# Patient Record
Sex: MALE | Race: BLACK OR AFRICAN AMERICAN | NOT HISPANIC OR LATINO | Employment: FULL TIME | URBAN - METROPOLITAN AREA
[De-identification: names, ages, dates, MRNs, and addresses within clinical notes are randomized per-mention and may not be internally consistent; named-entity substitution may affect disease eponyms.]

---

## 2020-03-23 ENCOUNTER — HOSPITAL ENCOUNTER (EMERGENCY)
Facility: HOSPITAL | Age: 30
Discharge: HOME | End: 2020-03-23
Attending: EMERGENCY MEDICINE

## 2020-03-23 VITALS
HEART RATE: 93 BPM | OXYGEN SATURATION: 98 % | SYSTOLIC BLOOD PRESSURE: 149 MMHG | WEIGHT: 142.5 LBS | TEMPERATURE: 99 F | HEIGHT: 72 IN | BODY MASS INDEX: 19.3 KG/M2 | RESPIRATION RATE: 20 BRPM | DIASTOLIC BLOOD PRESSURE: 85 MMHG

## 2020-03-23 DIAGNOSIS — R30.0 DYSURIA: Primary | ICD-10-CM

## 2020-03-23 LAB
BILIRUB UR QL STRIP: NEGATIVE
CLARITY UR: CLEAR
COLOR UR: YELLOW
GLUCOSE UR QL STRIP: NEGATIVE
HGB UR QL STRIP: NEGATIVE
KETONES UR QL STRIP: NEGATIVE
LEUKOCYTE ESTERASE UR QL STRIP: NEGATIVE
NITRITE UR QL STRIP: NEGATIVE
PH UR STRIP: 8 [PH] (ref 5–8)
PROT UR QL STRIP: NEGATIVE
SP GR UR STRIP: 1.02 (ref 1–1.03)
URN SPEC COLLECT METH UR: NORMAL
UROBILINOGEN UR STRIP-ACNC: 1 EU/DL

## 2020-03-23 PROCEDURE — 25000003 PHARM REV CODE 250: Performed by: PHYSICIAN ASSISTANT

## 2020-03-23 PROCEDURE — 87491 CHLMYD TRACH DNA AMP PROBE: CPT

## 2020-03-23 PROCEDURE — 96372 THER/PROPH/DIAG INJ SC/IM: CPT

## 2020-03-23 PROCEDURE — 99284 EMERGENCY DEPT VISIT MOD MDM: CPT | Mod: 25

## 2020-03-23 PROCEDURE — 63600175 PHARM REV CODE 636 W HCPCS: Performed by: PHYSICIAN ASSISTANT

## 2020-03-23 PROCEDURE — 81003 URINALYSIS AUTO W/O SCOPE: CPT

## 2020-03-23 RX ORDER — AZITHROMYCIN 250 MG/1
1000 TABLET, FILM COATED ORAL
Status: COMPLETED | OUTPATIENT
Start: 2020-03-23 | End: 2020-03-23

## 2020-03-23 RX ORDER — CEFTRIAXONE 250 MG/1
250 INJECTION, POWDER, FOR SOLUTION INTRAMUSCULAR; INTRAVENOUS
Status: COMPLETED | OUTPATIENT
Start: 2020-03-23 | End: 2020-03-23

## 2020-03-23 RX ADMIN — CEFTRIAXONE SODIUM 250 MG: 250 INJECTION, POWDER, FOR SOLUTION INTRAMUSCULAR; INTRAVENOUS at 04:03

## 2020-03-23 RX ADMIN — AZITHROMYCIN MONOHYDRATE 1000 MG: 250 TABLET ORAL at 04:03

## 2020-03-23 NOTE — ED PROVIDER NOTES
"   History      Chief Complaint   Patient presents with    Dysuria     Pt states, "I was treated for a UTI about 2 weeks ago and it feels like it is back."       Review of patient's allergies indicates:  No Known Allergies     HPI   HPI    3/23/2020, 4:42 PM   History obtained from the patient      History of Present Illness: Sam Jerome Milligan Jr. is a 29 y.o. male patient who presents to the Emergency Department for dysuria for 2 weeks.  Was not able to get abx from pharmacy due to cost. Symptoms are moderate in severity.     No further complaints or concerns at this time.           PCP: Primary Doctor No       Past Medical History:  No past medical history on file.      Past Surgical History:  No past surgical history on file.        Family History:  Family History   Problem Relation Age of Onset    Cataracts Maternal Grandmother            Social History:  Social History     Tobacco Use    Smoking status: Former Smoker   Substance and Sexual Activity    Alcohol use: No    Drug use: No    Sexual activity: Not on file       ROS     Review of Systems   Constitutional: Negative for chills and fever.   HENT: Negative for facial swelling and trouble swallowing.    Eyes: Negative for pain and discharge.   Respiratory: Negative for chest tightness and shortness of breath.    Cardiovascular: Negative for palpitations and leg swelling.   Gastrointestinal: Negative for diarrhea and vomiting.   Endocrine: Negative for polydipsia and polyuria.   Genitourinary: Positive for dysuria. Negative for decreased urine volume and flank pain.   Musculoskeletal: Negative for joint swelling and neck stiffness.   Skin: Negative for rash and wound.   Neurological: Negative for syncope and light-headedness.   All other systems reviewed and are negative.      Physical Exam      Initial Vitals [03/23/20 1632]   BP Pulse Resp Temp SpO2   (!) 149/85 93 20 99.1 °F (37.3 °C) 98 %      MAP       --         Physical Exam  Vital signs and " nursing notes reviewed.  Constitutional: Patient is in NAD. Awake and alert. Well-developed and well-nourished.  Head: Atraumatic. Normocephalic.  Eyes: PERRL. EOM intact. Conjunctivae nl. No scleral icterus.  ENT: Mucous membranes are moist. Oropharynx is clear.  Neck: Supple. No JVD. No lymphadenopathy.  No meningismus  Cardiovascular: Regular rate and rhythm. No murmurs, rubs, or gallops. Distal pulses are 2+ and symmetric.  Pulmonary/Chest: No respiratory distress. Clear to auscultation bilaterally. No wheezing, rales, or rhonchi.  Abdominal: Soft. Non-distended. No TTP. No rebound, guarding, or rigidity. Good bowel sounds.  Genitourinary: No CVA tenderness  Musculoskeletal: Moves all extremities. No edema.   Skin: Warm and dry.  Neurological: Awake and alert. No acute focal neurological deficits are appreciated.  Psychiatric: Normal affect. Good eye contact. Appropriate in content.      ED Course          Procedures  ED Vital Signs:  Vitals:    03/23/20 1632   BP: (!) 149/85   Pulse: 93   Resp: 20   Temp: 99.1 °F (37.3 °C)   TempSrc: Oral   SpO2: 98%   Weight: 64.7 kg (142 lb 8.4 oz)   Height: 6' (1.829 m)                 Imaging Results:  Imaging Results    None            The Emergency Provider reviewed the vital signs and test results, which are outlined above.    ED Discussion             Medication(s) given in the ER:  Medications   cefTRIAXone injection 250 mg (has no administration in time range)   azithromycin tablet 1,000 mg (has no administration in time range)           Follow-up Information     [facility] In 2 days.    Contact information:  [address] [facility] [address] [phone]                          Medication List      ASK your doctor about these medications    moxifloxacin 0.5 % ophthalmic solution  Commonly known as:  VIGAMOX     naproxen 375 MG tablet  Commonly known as:  NAPROSYN  Take 1 tablet (375 mg total) by mouth 2 (two) times daily with meals.      oxyCODONE-acetaminophen  mg per tablet  Commonly known as:  PERCOCET  Take 0.5-1 tablets by mouth every 4 (four) hours as needed for Pain.                Medical Decision Making        All findings were reviewed with the patient/family in detail.   All remaining questions and concerns were addressed at that time.  Patient/family has been counseled regarding the need for follow-up as well as the indication to return to the emergency room should new or worrisome developments occur.        MDM               Clinical Impression:        ICD-10-CM ICD-9-CM   1. Dysuria R30.0 788.1             Umm Ayoub PA-C  03/23/20 1643

## 2020-03-24 ENCOUNTER — TELEPHONE (OUTPATIENT)
Dept: EMERGENCY MEDICINE | Facility: HOSPITAL | Age: 30
End: 2020-03-24

## 2020-03-24 LAB
C TRACH DNA SPEC QL NAA+PROBE: NOT DETECTED
N GONORRHOEA DNA SPEC QL NAA+PROBE: DETECTED

## 2020-03-25 ENCOUNTER — TELEPHONE (OUTPATIENT)
Dept: EMERGENCY MEDICINE | Facility: HOSPITAL | Age: 30
End: 2020-03-25

## 2022-06-03 ENCOUNTER — HOSPITAL ENCOUNTER (EMERGENCY)
Facility: HOSPITAL | Age: 32
Discharge: HOME | End: 2022-06-03
Attending: EMERGENCY MEDICINE
Payer: MEDICAID

## 2022-06-03 VITALS
OXYGEN SATURATION: 99 % | WEIGHT: 150 LBS | DIASTOLIC BLOOD PRESSURE: 87 MMHG | HEIGHT: 72 IN | SYSTOLIC BLOOD PRESSURE: 133 MMHG | RESPIRATION RATE: 17 BRPM | TEMPERATURE: 98 F | BODY MASS INDEX: 20.32 KG/M2 | HEART RATE: 62 BPM

## 2022-06-03 DIAGNOSIS — H18.821 CORNEAL ABRASION OF RIGHT EYE DUE TO CONTACT LENS: Primary | ICD-10-CM

## 2022-06-03 PROCEDURE — 99282 EMERGENCY DEPT VISIT SF MDM: CPT

## 2022-06-03 PROCEDURE — 25000003 PHARM REV CODE 250: Performed by: EMERGENCY MEDICINE

## 2022-06-03 RX ORDER — TETRACAINE HYDROCHLORIDE 5 MG/ML
2 SOLUTION OPHTHALMIC
Status: DISCONTINUED | OUTPATIENT
Start: 2022-06-03 | End: 2022-06-03 | Stop reason: HOSPADM

## 2022-06-03 RX ORDER — HYDROCODONE BITARTRATE AND ACETAMINOPHEN 5; 325 MG/1; MG/1
1 TABLET ORAL EVERY 4 HOURS PRN
Qty: 10 TABLET | Refills: 0 | Status: SHIPPED | OUTPATIENT
Start: 2022-06-03

## 2022-06-03 RX ORDER — CIPROFLOXACIN HYDROCHLORIDE 3 MG/ML
1 SOLUTION/ DROPS OPHTHALMIC EVERY 4 HOURS
Qty: 1 EACH | Refills: 0 | Status: SHIPPED | OUTPATIENT
Start: 2022-06-03

## 2022-06-03 NOTE — Clinical Note
"Jean Paul Ervin" Milligan was seen and treated in our emergency department on 6/3/2022.  He may return to work on 06/05/2022.       If you have any questions or concerns, please don't hesitate to call.      ARETHA Anderson"

## 2022-06-03 NOTE — ED PROVIDER NOTES
Encounter Date: 6/3/2022       History     Chief Complaint   Patient presents with    Eye Pain     Pain and itching to right eye.     31 year old male presents to the ED complaining of right eye pain and itching that began this morning. Patient reports he woke up their morning with his eyelid crusted shut. He wears contacts and slept with them in last night. The patient states he initially thought it was his allergies which usually relieve with eye drops but he had no relief today. 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 He states his right eye vision is blurry. The patient endorses photophobia and is sitting in the exam with with sunglasses on requesting the lights be dimmed. Eye pain is increased with opening his eye. No fevers, chills, cough, sore throat, or ear pain.         Review of patient's allergies indicates:  No Known Allergies  No past medical history on file.  No past surgical history on file.  Family History   Problem Relation Age of Onset    Cataracts Maternal Grandmother      Social History     Tobacco Use    Smoking status: Former Smoker   Substance Use Topics    Alcohol use: No    Drug use: No     Review of Systems   Constitutional: Negative for chills, fever and unexpected weight change.   Eyes: Positive for photophobia, pain (right), discharge, itching and visual disturbance. Negative for redness.   Respiratory: Negative for shortness of breath.    Cardiovascular: Negative for chest pain.   Gastrointestinal: Negative for abdominal pain, nausea and vomiting.   Genitourinary: Negative for dysuria.   Musculoskeletal: Negative for arthralgias.   Skin: Negative for rash.   All other systems reviewed and are negative.      Physical Exam     Initial Vitals [06/03/22 0825]   BP Pulse Resp Temp SpO2   (!) 138/90 66 16 98.4 °F (36.9 °C) 98 %      MAP       --         Physical Exam    Nursing note and vitals reviewed.  Constitutional: Vital  signs are normal. He appears well-developed.   HENT:   Head: Normocephalic and atraumatic.   Right Ear: Tympanic membrane and external ear normal.   Left Ear: Tympanic membrane and external ear normal.   Nose: Nose normal.   Mouth/Throat: Uvula is midline, oropharynx is clear and moist and mucous membranes are normal.   Eyes: Conjunctivae, EOM and lids are normal. Pupils are equal, round, and reactive to light.   Slit lamp exam:       The right eye shows corneal abrasion. The right eye shows no corneal ulcer.   Neck: Trachea normal. Neck supple.   Cardiovascular: Normal rate, regular rhythm, normal heart sounds and normal pulses.   Abdominal: Abdomen is soft. Bowel sounds are normal.   Musculoskeletal:      Cervical back: Neck supple.     Neurological: He is alert. He has normal strength. GCS eye subscore is 4. GCS verbal subscore is 5. GCS motor subscore is 6.   Skin: Skin is warm and dry.   Psychiatric: He has a normal mood and affect. His speech is normal and behavior is normal. Judgment and thought content normal. Cognition and memory are normal.         ED Course   Procedures  Labs Reviewed - No data to display       Imaging Results    None          Medications - No data to display  Medical Decision Making:   Initial Assessment:   31 year old male presents to the ED complaining of right eye pain and itching that began this morning. Patient reports he woke up their morning with his eyelid crusted shut. He wears contacts and slept with them in last night. The patient states he initially thought it was his allergies which usually relieve with eye drops but he had no relief today. 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 He states his right eye vision is blurry. The patient endorses photophobia and is sitting in the exam with with sunglasses on requesting the lights be dimmed. Eye pain is increased with opening his eye. No fevers, chills, cough, sore throat,  or ear pain.   Differential Diagnosis:   Conjunctivitis, blepharitis, corneal abrasion  ED Management:  31 year old male presents to the ED with right eye pain, photophobia, and itching. He wears contacts and works construction. Slit lamp exam performed shows corneal abrasion. No corneal ulcer noted. Tetanus is UTD. Discharge home with antibiotics, pain control, and opthalmology follow-up. Patient instructed not to wear contacts in that eye until opthalmologic evaluation. Return precautions given and patient is agreeable with plan.     Attending Note:    I discussed the patient's care with Advanced Practice Clinician.  I reviewed their note and agree with the history, physical, assessment, diagnosis, treatment, all procedures performed, xray and EKG interpretations and discharge plan provided by the Advanced Practice Clinician. My overall impression is right eye corneal abrasion.  Patient has history of contact lens use.  He will be discharged home with Cipro.  The patient has been instructed to follow up with their physician or the one provided as well as specific return precautions.   Katheryn Oneil M.D. 6/3/2022 7:44 PM                        Clinical Impression:   Final diagnoses:  [H18.821] Corneal abrasion of right eye due to contact lens (Primary)          ED Disposition Condition    Discharge Stable        ED Prescriptions     Medication Sig Dispense Start Date End Date Auth. Provider    ciprofloxacin HCl (CILOXAN) 0.3 % ophthalmic solution Place 1 drop into the right eye every 4 (four) hours. 1 each 6/3/2022  ARETHA Anderson    HYDROcodone-acetaminophen (NORCO) 5-325 mg per tablet Take 1 tablet by mouth every 4 (four) hours as needed. 10 tablet 6/3/2022  ARETHA Anderson        Follow-up Information     Follow up With Specialties Details Why Contact Info    Bari Pimentel MD Ophthalmology Schedule an appointment as soon as possible for a visit in 2 days  [address] [place] [address]  [id number]  [phone]             Katheryn Oneil MD  06/03/22 1945

## 2022-06-03 NOTE — DISCHARGE INSTRUCTIONS
Medications as directed please follow-up with ophthalmologist as directed for further evaluation, definitive care  Return for any concerns  Please do not wear contact lenses until abrasions completely healed and you were released to do so by the ophthalmologist.

## 2022-08-15 ENCOUNTER — HOSPITAL ENCOUNTER (EMERGENCY)
Facility: HOSPITAL | Age: 32
Discharge: HOME | End: 2022-08-15
Attending: EMERGENCY MEDICINE
Payer: MEDICAID

## 2022-08-15 VITALS
BODY MASS INDEX: 18.96 KG/M2 | HEART RATE: 77 BPM | RESPIRATION RATE: 18 BRPM | OXYGEN SATURATION: 98 % | DIASTOLIC BLOOD PRESSURE: 81 MMHG | SYSTOLIC BLOOD PRESSURE: 128 MMHG | HEIGHT: 72 IN | TEMPERATURE: 98 F | WEIGHT: 140 LBS

## 2022-08-15 DIAGNOSIS — K02.9 PAIN DUE TO DENTAL CARIES: Primary | ICD-10-CM

## 2022-08-15 DIAGNOSIS — K08.89 TOOTHACHE: ICD-10-CM

## 2022-08-15 PROCEDURE — 96372 THER/PROPH/DIAG INJ SC/IM: CPT | Performed by: NURSE PRACTITIONER

## 2022-08-15 PROCEDURE — 63600175 PHARM REV CODE 636 W HCPCS: Performed by: NURSE PRACTITIONER

## 2022-08-15 PROCEDURE — 99284 EMERGENCY DEPT VISIT MOD MDM: CPT

## 2022-08-15 RX ORDER — KETOROLAC TROMETHAMINE 30 MG/ML
30 INJECTION, SOLUTION INTRAMUSCULAR; INTRAVENOUS
Status: COMPLETED | OUTPATIENT
Start: 2022-08-15 | End: 2022-08-15

## 2022-08-15 RX ORDER — AMOXICILLIN 875 MG/1
875 TABLET, FILM COATED ORAL 2 TIMES DAILY
Qty: 14 TABLET | Refills: 0 | Status: SHIPPED | OUTPATIENT
Start: 2022-08-15

## 2022-08-15 RX ORDER — IBUPROFEN 600 MG/1
600 TABLET ORAL EVERY 6 HOURS PRN
Qty: 20 TABLET | Refills: 0 | OUTPATIENT
Start: 2022-08-15 | End: 2022-09-12

## 2022-08-15 RX ADMIN — KETOROLAC TROMETHAMINE 30 MG: 30 INJECTION, SOLUTION INTRAMUSCULAR at 01:08

## 2022-08-15 NOTE — Clinical Note
"Jean Paul Ervin" Milligan was seen and treated in our emergency department on 8/15/2022.  He may return to work on 08/17/2022.       If you have any questions or concerns, please don't hesitate to call.      Juliette Locke NP"

## 2022-08-15 NOTE — ED PROVIDER NOTES
Encounter Date: 8/15/2022       History     Chief Complaint   Patient presents with    Dental Pain     C/o right lower dental pain x 2 months. Pt reports his dentist cancelling and unable to get the tooth pulled. Has not followed up. Pain worse.      32-year-old male presents to the ER with pain to his right lower posterior molar tooth for the past 3 days.  He states he has a cavity in this tooth, it has been given an problems for months, he was prescribed amoxicillin antibiotic about a month ago and symptoms resolved, he has been unable to make an appointment with his dentist in the meantime.  He knows that he needs to be pulled.  He reports pain began again 2 days ago.  No drainage or swelling.  No facial swelling.  No fever.  No difficulty eating or drinking or other complaints or concerns.        Review of patient's allergies indicates:  No Known Allergies  No past medical history on file.  No past surgical history on file.  Family History   Problem Relation Age of Onset    Cataracts Maternal Grandmother      Social History     Tobacco Use    Smoking status: Former Smoker   Substance Use Topics    Alcohol use: No    Drug use: No     Review of Systems   Constitutional: Negative for chills, diaphoresis, fatigue and fever.   HENT: Positive for dental problem. Negative for sore throat.    Eyes: Negative for photophobia and visual disturbance.   Respiratory: Negative for shortness of breath.    Cardiovascular: Negative for chest pain.   Gastrointestinal: Negative for nausea.   Endocrine: Negative for polydipsia, polyphagia and polyuria.   Genitourinary: Negative for dysuria.   Musculoskeletal: Negative for back pain.   Skin: Negative for rash.   Allergic/Immunologic: Negative for immunocompromised state.   Neurological: Negative for dizziness, seizures, syncope, speech difficulty, weakness, light-headedness, numbness and headaches.   Hematological: Does not bruise/bleed easily.   All other systems reviewed and  are negative.      Physical Exam     Initial Vitals [08/15/22 1210]   BP Pulse Resp Temp SpO2   121/83 71 17 98.1 °F (36.7 °C) 99 %      MAP       --         Physical Exam    Nursing note and vitals reviewed.  Constitutional: He appears well-developed and well-nourished. He is not diaphoretic. No distress.   HENT:   Head: Normocephalic and atraumatic.   Right Ear: External ear normal.   Left Ear: External ear normal.   Nose: Nose normal.   Mouth/Throat: Oropharynx is clear and moist and mucous membranes are normal. No oral lesions. No trismus in the jaw. Abnormal dentition. Dental caries present. No dental abscesses, uvula swelling or lacerations. No oropharyngeal exudate.           ED Course   Procedures  Labs Reviewed - No data to display       Imaging Results    None          Medications   ketorolac injection 30 mg (30 mg Intramuscular Given 8/15/22 1303)     Medical Decision Making:   ED Management:  Chronic dental caries throughout multiple teeth was notable in his right posterior lower molar tooth that he has pain at.  Will place on Augmentin, and have patient follow back up with his dentist for re-evaluation further management.  We will also for prescribed Motrin for pain, and administer Toradol IM injection for pain management ER.  He is happy this plan.                      Clinical Impression:   Final diagnoses:  [K02.9] Pain due to dental caries (Primary)  [K08.89] Toothache          ED Disposition Condition    Discharge Stable        ED Prescriptions     Medication Sig Dispense Start Date End Date Auth. Provider    amoxicillin (AMOXIL) 875 MG tablet Take 1 tablet (875 mg total) by mouth 2 (two) times daily. 14 tablet 8/15/2022  Juliette Locke NP    ibuprofen (ADVIL,MOTRIN) 600 MG tablet Take 1 tablet (600 mg total) by mouth every 6 (six) hours as needed for Pain. 20 tablet 8/15/2022  Juliette Locke NP        Follow-up Information     Follow up With Specialties Details Why Contact Info Additional  Information    [facility]  Schedule an appointment as soon as possible for a visit in 1 day  [address]  [facility] [address]  [phone]       Dentist  Schedule an appointment as soon as possible for a visit in 3 days for ER visit follow up and re-evaluation      [facility] - Emergency Dept Emergency Medicine Go to  As needed, If symptoms worsen [address]  [facility] [address]  [phone] 1st floor           Juliette Locke NP  08/16/22 4552

## 2022-09-11 ENCOUNTER — HOSPITAL ENCOUNTER (EMERGENCY)
Facility: HOSPITAL | Age: 32
Discharge: HOME | End: 2022-09-12
Attending: EMERGENCY MEDICINE
Payer: MEDICAID

## 2022-09-11 DIAGNOSIS — M25.579 ANKLE PAIN: ICD-10-CM

## 2022-09-11 DIAGNOSIS — R52 PAIN: ICD-10-CM

## 2022-09-11 DIAGNOSIS — S93.601A SPRAIN OF RIGHT FOOT, INITIAL ENCOUNTER: Primary | ICD-10-CM

## 2022-09-11 PROCEDURE — 99283 EMERGENCY DEPT VISIT LOW MDM: CPT

## 2022-09-11 NOTE — Clinical Note
"Jean Paul Ervin" Milligan was seen and treated in our emergency department on 9/11/2022.  He may return to work on 09/14/2022.       If you have any questions or concerns, please don't hesitate to call.      Alysia Mullen NP"

## 2022-09-12 VITALS
SYSTOLIC BLOOD PRESSURE: 126 MMHG | BODY MASS INDEX: 19.64 KG/M2 | RESPIRATION RATE: 20 BRPM | HEART RATE: 65 BPM | DIASTOLIC BLOOD PRESSURE: 89 MMHG | WEIGHT: 145 LBS | OXYGEN SATURATION: 97 % | HEIGHT: 72 IN | TEMPERATURE: 98 F

## 2022-09-12 PROCEDURE — 25000003 PHARM REV CODE 250: Performed by: NURSE PRACTITIONER

## 2022-09-12 PROCEDURE — 99283 EMERGENCY DEPT VISIT LOW MDM: CPT

## 2022-09-12 RX ORDER — IBUPROFEN 400 MG/1
800 TABLET ORAL
Status: COMPLETED | OUTPATIENT
Start: 2022-09-12 | End: 2022-09-12

## 2022-09-12 RX ORDER — IBUPROFEN 800 MG/1
800 TABLET ORAL EVERY 8 HOURS PRN
Qty: 20 TABLET | Refills: 0 | Status: SHIPPED | OUTPATIENT
Start: 2022-09-12

## 2022-09-12 RX ADMIN — IBUPROFEN 800 MG: 400 TABLET ORAL at 01:09

## 2022-09-12 NOTE — ED PROVIDER NOTES
Encounter Date: 9/11/2022       History     Chief Complaint   Patient presents with    Foot Injury     Right foot     Sam Milligan is a 32 year old male with no pmh presenting to the ED with c/o right foot pain. He states that he was stepping down and accidentally rolled his foot. He reports feeling a pop and has had difficulty weight bearing since then. He has taken no medication for pain.     Review of patient's allergies indicates:  No Known Allergies  No past medical history on file.  No past surgical history on file.  Family History   Problem Relation Age of Onset    Cataracts Maternal Grandmother      Social History     Tobacco Use    Smoking status: Former   Substance Use Topics    Alcohol use: No    Drug use: No     Review of Systems   Constitutional:  Negative for fever.   HENT:  Negative for sore throat.    Respiratory:  Negative for shortness of breath.    Cardiovascular:  Negative for chest pain.   Gastrointestinal:  Negative for nausea.   Genitourinary:  Negative for dysuria.   Musculoskeletal:  Positive for arthralgias (right foot/ankle) and joint swelling (right foot/ankle). Negative for back pain.   Skin:  Negative for rash.   Neurological:  Negative for weakness.   Hematological:  Does not bruise/bleed easily.     Physical Exam     Initial Vitals [09/11/22 2333]   BP Pulse Resp Temp SpO2   126/89 65 17 98 °F (36.7 °C) 97 %      MAP       --         Physical Exam    Nursing note and vitals reviewed.  Constitutional: He appears well-developed and well-nourished. He is not diaphoretic. No distress.   HENT:   Head: Normocephalic and atraumatic.   Mouth/Throat: Oropharynx is clear and moist.   Eyes: Conjunctivae are normal.   Neck: Neck supple.   Cardiovascular:  Normal rate, regular rhythm, normal heart sounds and intact distal pulses.     Exam reveals no gallop and no friction rub.       No murmur heard.  Pulmonary/Chest: Breath sounds normal. He has no wheezes. He has no rhonchi. He has no rales.    Musculoskeletal:      Cervical back: Neck supple.      Right ankle: Swelling present. Tenderness present over the lateral malleolus. No medial malleolus or proximal fibula tenderness. Decreased range of motion (due to pain/swelling). Normal pulse.      Right Achilles Tendon: Tenderness present. No defects. Peralta's test negative.      Right foot: Decreased range of motion. Swelling (right lateral foot) and tenderness present. Normal pulse.     Neurological: He is alert and oriented to person, place, and time.   Skin: No rash noted. No erythema.       ED Course   Procedures  Labs Reviewed - No data to display       Imaging Results              X-Ray Ankle Complete Right (Final result)  Result time 09/12/22 01:16:21      Final result by Ap Neri MD (09/12/22 01:16:21)                   Narrative:    EXAM DESCRIPTION: XR ANKLE 3 VIEW RIGHT    CLINICAL HISTORY: 32 years, Male, fall    COMPARISON: None.    FINDINGS:    There is a possible ankle joint effusion.    There is lateral soft tissue swelling.    No fracture or dislocation.    There are some benign ossicles inferior to the lateral malleolus.    Mineralization of bone appears normal.    IMPRESSION:    Possible ankle joint effusion.    Lateral soft tissue swelling.        Electronically signed by:  Ap Neri MD  9/12/2022 1:16 AM CDT Workstation: [id number]                                     X-Ray Foot Complete Right (Final result)  Result time 09/12/22 01:13:43      Final result by Gail Simon MD (09/12/22 01:13:43)                   Narrative:    EXAM:  XR Right Foot Complete, 3 or More Views    CLINICAL HISTORY:  The patient is 32 years old and is Male; fall, bruising    TECHNIQUE:  Frontal, lateral and oblique views of the right foot.    COMPARISON:  No relevant prior studies available.    FINDINGS:  BONES/JOINTS:  Unremarkable.  No acute fracture.  No dislocation.  SOFT TISSUES:  Unremarkable.  No radiopaque foreign  body.    IMPRESSION:  Normal right foot radiographs.    Electronically signed by:  Gail Simon MD  9/12/2022 1:13 AM CDT Workstation: [id number]                                     Medications   ibuprofen tablet 800 mg (has no administration in time range)           APC / Resident Notes:   The patient appears to have a foot sprain.  The patient's xrays show no signs of fracture, dislocation, or subluxation.  The patient could have a ligamentous injury, but the ankle and foot do not appear to be unstable.  The patient will be discharged home to follow up with their physician or the doctor provided.  They will be treated with supportive care.Walking boot provided as well as ace wrap and crutches. He will be referred to podiatry and orthopedics for follow up care. Based on my clinical evaluation, I do not appreciate any immediate, emergent, or life threatening condition or etiology that warrants additional workup today and feel that the patient can be discharged with close follow up care.                    Clinical Impression:   Final diagnoses:  [R52] Pain  [M25.579] Ankle pain  [S93.601A] Sprain of right foot, initial encounter (Primary)        ED Disposition Condition    Discharge Stable          ED Prescriptions       Medication Sig Dispense Start Date End Date Auth. Provider    ibuprofen (ADVIL,MOTRIN) 800 MG tablet Take 1 tablet (800 mg total) by mouth every 8 (eight) hours as needed for Pain. 20 tablet 9/12/2022 -- Alysia Mullen NP          Follow-up Information       Follow up With Specialties Details Why Contact Info Additional Information    [facility] - Emergency Dept Emergency Medicine  As needed, If symptoms worsen [address] [facility] [address] [phone] 1st floor             Alysia Mullen NP  09/12/22 0145